# Patient Record
Sex: MALE | URBAN - METROPOLITAN AREA
[De-identification: names, ages, dates, MRNs, and addresses within clinical notes are randomized per-mention and may not be internally consistent; named-entity substitution may affect disease eponyms.]

---

## 2021-04-15 DIAGNOSIS — Z23 ENCOUNTER FOR IMMUNIZATION: ICD-10-CM

## 2023-10-19 ENCOUNTER — TELEPHONE (OUTPATIENT)
Dept: INFECTIOUS DISEASES | Facility: CLINIC | Age: 68
End: 2023-10-19

## 2023-10-19 NOTE — TELEPHONE ENCOUNTER
Patient calls the office today regarding appointment. Patient states he is calling to make and appointment to be seen by ID. Informed patient would need a referral to be seen. Also informed patient along with referral patients records and any labs or imaging would also need to be sent to office. Informed patient once they are all received someone would either get back to him or the referring provider regarding the referral.     Patient verbalizes understanding at this time.

## 2023-10-23 NOTE — TELEPHONE ENCOUNTER
Patient calls this office to follow up to see if we received his records. He states that they were sent to us. He states it is for lyme disease. He confirms fax number. Discussed with patient IDSA guidelines as patient is on doxycycline already.